# Patient Record
Sex: MALE | Race: WHITE | NOT HISPANIC OR LATINO | URBAN - METROPOLITAN AREA
[De-identification: names, ages, dates, MRNs, and addresses within clinical notes are randomized per-mention and may not be internally consistent; named-entity substitution may affect disease eponyms.]

---

## 2021-04-08 DIAGNOSIS — Z23 ENCOUNTER FOR IMMUNIZATION: ICD-10-CM

## 2025-04-23 ENCOUNTER — ANESTHESIA EVENT (OUTPATIENT)
Dept: PERIOP | Facility: HOSPITAL | Age: 70
DRG: 698 | End: 2025-04-23
Payer: MEDICARE

## 2025-04-23 RX ORDER — PRAVASTATIN SODIUM 40 MG
40 TABLET ORAL DAILY
COMMUNITY

## 2025-04-23 RX ORDER — PIOGLITAZONE 45 MG/1
45 TABLET ORAL DAILY
COMMUNITY

## 2025-04-23 RX ORDER — QUETIAPINE FUMARATE 100 MG/1
100 TABLET, FILM COATED ORAL
COMMUNITY

## 2025-04-23 RX ORDER — ALFUZOSIN HYDROCHLORIDE 10 MG/1
10 TABLET, EXTENDED RELEASE ORAL DAILY
COMMUNITY

## 2025-04-23 RX ORDER — METOPROLOL SUCCINATE 50 MG/1
50 TABLET, EXTENDED RELEASE ORAL DAILY
COMMUNITY

## 2025-04-23 RX ORDER — QUETIAPINE FUMARATE 400 MG/1
400 TABLET, FILM COATED ORAL
COMMUNITY

## 2025-04-23 RX ORDER — VITAMIN B COMPLEX
1 CAPSULE ORAL DAILY
COMMUNITY

## 2025-04-23 RX ORDER — ZOLPIDEM TARTRATE 5 MG/1
5 TABLET ORAL
COMMUNITY

## 2025-04-23 RX ORDER — ASPIRIN 81 MG/1
81 TABLET, CHEWABLE ORAL DAILY
COMMUNITY

## 2025-04-23 RX ORDER — GLIMEPIRIDE 4 MG/1
4 TABLET ORAL
COMMUNITY

## 2025-04-23 NOTE — PRE-PROCEDURE INSTRUCTIONS
Pre-Surgery Instructions:   Medication Instructions    alfuzosin (UROXATRAL) 10 mg 24 hr tablet Hold day of surgery.    aspirin 81 mg chewable tablet Instructions provided by MD    b complex vitamins capsule Stop taking 7 days prior to surgery.    Cholecalciferol (VITAMIN D3) 1,000 units tablet Stop taking 7 days prior to surgery.    glimepiride (AMARYL) 4 mg tablet Hold day of surgery.    metFORMIN (GLUCOPHAGE) 1000 MG tablet Hold day of surgery.    metoprolol succinate (TOPROL-XL) 50 mg 24 hr tablet Take day of surgery.    pioglitazone (ACTOS) 45 mg tablet Hold day of surgery.    pravastatin (PRAVACHOL) 40 mg tablet Take night before surgery    QUEtiapine (SEROquel) 100 mg tablet Take night before surgery    QUEtiapine (SEROquel) 400 MG tablet Take night before surgery    zolpidem (Ambien) 5 mg tablet Hold day of surgery.   Instructed to hold asa @ CC appt. Medication instructions for day of surgery reviewed. Please take all instructed medications with only a sip of water.       You will receive a call one business day prior to surgery with an arrival time and hospital directions. If your surgery is scheduled on a Monday, the hospital will be calling you on the Friday prior to your surgery. If you have not heard from anyone by 8pm, please call the hospital supervisor through the hospital  at [phone]. ([place] [phone] or [place] [phone]).    Do not eat or drink anything after midnight the night before your surgery, including candy, mints, lifesavers, or chewing gum. Do not drink alcohol 24hrs before your surgery. Try not to smoke at least 24hrs before your surgery.       Follow the pre surgery showering instructions as listed in the “My Surgical Experience Booklet” or otherwise provided by your surgeon's office. Do not use a blade to shave the surgical area 1 week before surgery. It is okay to use a clean electric clippers up to 24 hours before surgery. Do not apply any lotions, creams,  including makeup, cologne, deodorant, or perfumes after showering on the day of your surgery. Do not use dry shampoo, hair spray, hair gel, or any type of hair products.     No contact lenses, eye make-up, or artificial eyelashes. Remove nail polish, including gel polish, and any artificial, gel, or acrylic nails if possible. Remove all jewelry including rings and body piercing jewelry.     Wear causal clothing that is easy to take on and off. Consider your type of surgery.    Keep any valuables, jewelry, piercings at home. Please bring any specially ordered equipment (sling, braces) if indicated.    Arrange for a responsible person to drive you to and from the hospital on the day of your surgery. Please confirm the visitor policy for the day of your procedure when you receive your phone call with an arrival time.     Call the surgeon's office with any new illnesses, exposures, or additional questions prior to surgery.    Please reference your “My Surgical Experience Booklet” for additional information to prepare for your upcoming surgery.
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
## 2025-05-01 ENCOUNTER — ANESTHESIA (OUTPATIENT)
Dept: PERIOP | Facility: HOSPITAL | Age: 70
DRG: 698 | End: 2025-05-01
Payer: MEDICARE

## 2025-05-01 ENCOUNTER — HOSPITAL ENCOUNTER (OUTPATIENT)
Facility: HOSPITAL | Age: 70
Setting detail: OUTPATIENT SURGERY
Discharge: HOME | DRG: 698 | End: 2025-05-01
Attending: SPECIALIST | Admitting: SPECIALIST
Payer: MEDICARE

## 2025-05-01 VITALS
TEMPERATURE: 97.1 F | WEIGHT: 254.41 LBS | DIASTOLIC BLOOD PRESSURE: 84 MMHG | HEART RATE: 60 BPM | SYSTOLIC BLOOD PRESSURE: 155 MMHG | OXYGEN SATURATION: 100 % | BODY MASS INDEX: 34.46 KG/M2 | HEIGHT: 72 IN | RESPIRATION RATE: 18 BRPM

## 2025-05-01 DIAGNOSIS — N40.1 BENIGN PROSTATIC HYPERPLASIA WITH LOWER URINARY TRACT SYMPTOMS: ICD-10-CM

## 2025-05-01 LAB — GLUCOSE SERPL-MCNC: 162 MG/DL (ref 65–140)

## 2025-05-01 PROCEDURE — 88305 TISSUE EXAM BY PATHOLOGIST: CPT | Performed by: PATHOLOGY

## 2025-05-01 PROCEDURE — 82948 REAGENT STRIP/BLOOD GLUCOSE: CPT

## 2025-05-01 RX ORDER — FENTANYL CITRATE 50 UG/ML
INJECTION, SOLUTION INTRAMUSCULAR; INTRAVENOUS AS NEEDED
Status: DISCONTINUED | OUTPATIENT
Start: 2025-05-01 | End: 2025-05-01

## 2025-05-01 RX ORDER — GLYCINE 1.5 G/100ML
SOLUTION IRRIGATION AS NEEDED
Status: DISCONTINUED | OUTPATIENT
Start: 2025-05-01 | End: 2025-05-01 | Stop reason: HOSPADM

## 2025-05-01 RX ORDER — MIDAZOLAM HYDROCHLORIDE 2 MG/2ML
INJECTION, SOLUTION INTRAMUSCULAR; INTRAVENOUS AS NEEDED
Status: DISCONTINUED | OUTPATIENT
Start: 2025-05-01 | End: 2025-05-01

## 2025-05-01 RX ORDER — OXYCODONE AND ACETAMINOPHEN 5; 325 MG/1; MG/1
1 TABLET ORAL ONCE AS NEEDED
Status: DISCONTINUED | OUTPATIENT
Start: 2025-05-01 | End: 2025-05-01 | Stop reason: HOSPADM

## 2025-05-01 RX ORDER — FENTANYL CITRATE/PF 50 MCG/ML
25 SYRINGE (ML) INJECTION
Status: DISCONTINUED | OUTPATIENT
Start: 2025-05-01 | End: 2025-05-01 | Stop reason: HOSPADM

## 2025-05-01 RX ORDER — ONDANSETRON 2 MG/ML
4 INJECTION INTRAMUSCULAR; INTRAVENOUS ONCE AS NEEDED
Status: DISCONTINUED | OUTPATIENT
Start: 2025-05-01 | End: 2025-05-01 | Stop reason: HOSPADM

## 2025-05-01 RX ORDER — PROPOFOL 10 MG/ML
INJECTION, EMULSION INTRAVENOUS AS NEEDED
Status: DISCONTINUED | OUTPATIENT
Start: 2025-05-01 | End: 2025-05-01

## 2025-05-01 RX ORDER — CEFAZOLIN SODIUM 2 G/50ML
2000 SOLUTION INTRAVENOUS ONCE
Status: COMPLETED | OUTPATIENT
Start: 2025-05-01 | End: 2025-05-01

## 2025-05-01 RX ORDER — DEXAMETHASONE SODIUM PHOSPHATE 10 MG/ML
INJECTION, SOLUTION INTRAMUSCULAR; INTRAVENOUS AS NEEDED
Status: DISCONTINUED | OUTPATIENT
Start: 2025-05-01 | End: 2025-05-01

## 2025-05-01 RX ORDER — HYDROMORPHONE HCL/PF 1 MG/ML
0.5 SYRINGE (ML) INJECTION
Status: DISCONTINUED | OUTPATIENT
Start: 2025-05-01 | End: 2025-05-01 | Stop reason: HOSPADM

## 2025-05-01 RX ORDER — LIDOCAINE HCL/PF 100 MG/5ML
SYRINGE (ML) INJECTION AS NEEDED
Status: DISCONTINUED | OUTPATIENT
Start: 2025-05-01 | End: 2025-05-01

## 2025-05-01 RX ORDER — ONDANSETRON 2 MG/ML
INJECTION INTRAMUSCULAR; INTRAVENOUS AS NEEDED
Status: DISCONTINUED | OUTPATIENT
Start: 2025-05-01 | End: 2025-05-01

## 2025-05-01 RX ADMIN — FENTANYL CITRATE 50 MCG: 50 INJECTION, SOLUTION INTRAMUSCULAR; INTRAVENOUS at 09:59

## 2025-05-01 RX ADMIN — MIDAZOLAM 2 MG: 1 INJECTION INTRAMUSCULAR; INTRAVENOUS at 09:32

## 2025-05-01 RX ADMIN — PROPOFOL 200 MG: 10 INJECTION, EMULSION INTRAVENOUS at 09:35

## 2025-05-01 RX ADMIN — CEFAZOLIN SODIUM 2000 MG: 2 SOLUTION INTRAVENOUS at 09:32

## 2025-05-01 RX ADMIN — FENTANYL CITRATE 50 MCG: 50 INJECTION, SOLUTION INTRAMUSCULAR; INTRAVENOUS at 09:35

## 2025-05-01 RX ADMIN — LIDOCAINE HYDROCHLORIDE 100 MG: 20 INJECTION, SOLUTION INTRAVENOUS at 09:35

## 2025-05-01 RX ADMIN — DEXAMETHASONE SODIUM PHOSPHATE 10 MG: 10 INJECTION, SOLUTION INTRAMUSCULAR; INTRAVENOUS at 09:41

## 2025-05-01 RX ADMIN — FENTANYL CITRATE 50 MCG: 50 INJECTION, SOLUTION INTRAMUSCULAR; INTRAVENOUS at 09:44

## 2025-05-01 RX ADMIN — ONDANSETRON 4 MG: 2 INJECTION INTRAMUSCULAR; INTRAVENOUS at 09:41

## 2025-05-01 NOTE — PERIOPERATIVE NURSING NOTE
Received from [facility] . Awake,alert. Call bell in reach, rails up. #22 bullock patent for pink tinged urine.

## 2025-05-01 NOTE — DISCHARGE INSTR - AVS FIRST PAGE
#1 no heavy straining or lifting above 10 pounds for 2 weeks    #2 call office fevers, chills, or worsening blood in the urine.    #3  Do not remove tape from leg no aspirin Motrin or nonsteroidals no straining to present to office tomorrow 945 to have catheter out drink lots of water follow-up visit Dr. Ramirez me 30th 10:45 AM      Ike Ramirez M.D. [facility] office  [address] [place] [address].  [place] [address]  [phone]  8:30 AM to 4:30 PM  Monday through Friday    [place] office  [address]  [address]  [place] [address] [phone]  1:00 to 5:00 PM  Wednesday

## 2025-05-01 NOTE — ANESTHESIA POSTPROCEDURE EVALUATION
Post-Op Assessment Note    CV Status:  Stable    Pain management: adequate       Mental Status:  Alert and awake   Hydration Status:  Euvolemic   PONV Controlled:  Controlled   Airway Patency:  Patent     Post Op Vitals Reviewed: Yes    No anethesia notable event occurred.    Staff: Anesthesiologist           Last Filed PACU Vitals:  Vitals Value Taken Time   Temp 97    Pulse 68    /76    Resp 14    SpO2 99

## 2025-05-01 NOTE — PROGRESS NOTES
"Progress Note - Urology      Patient: Chase Man   : 1955 Sex: male   MRN: [id number]     CSN: [id number]  Unit/Bed#: OR POOL     SUBJECTIVE:   Patient surgical preop unit  No change history physical aware of postop ED retrograde ejaculation stress incontinence urinary retention additional urologic procedures      Objective   Vitals: Ht 6' (1.829 m)   Wt 119 kg (263 lb)   BMI 35.67 kg/m²     No intake/output data recorded.      Physical Exam:   General Alert awake   Normocephalic atraumatic PERRLA  Lungs clear bilaterally  Cardiac normal S1 normal S2  Abdomen soft, flank pain  Extremities no edema      Lab Results: CBC: No results found for: \"WBC\", \"HGB\", \"HCT\", \"MCV\", \"PLT\", \"ADJUSTEDWBC\", \"RBC\", \"MCH\", \"MCHC\", \"RDW\", \"MPV\", \"NRBC\"  CMP:   Lab Results   Component Value Date     2024    CO2 30 2024    BUN 15 2024    CREATININE 0.94 2024    CALCIUM 9.4 2024    AST 17 2019    ALT 15 2019    ALKPHOS 47 2019    EGFR 89 2024     Urinalysis: No results found for: \"COLORU\", \"CLARITYU\", \"SPECGRAV\", \"PHUR\", \"LEUKOCYTESUR\", \"NITRITE\", \"PROTEINUA\", \"GLUCOSEU\", \"KETONESU\", \"BILIRUBINUR\", \"BLOODU\"  Urine Culture: No results found for: \"URINECX\"  PSA: No results found for: \"PSA\"      Assessment/ Plan:  Cystoscopy TURP          Ike Ramirez MD  "

## 2025-05-01 NOTE — OP NOTE
OPERATIVE REPORT  PATIENT NAME: Chase Man    :  1955  MRN: [id number]  Pt Location: [facility] OR ROOM 04    SURGERY DATE: 2025    Surgeons and Role:     * Ike Ramirez MD - Primary    Preop Diagnosis:  Benign prostatic hyperplasia with lower urinary tract symptoms [N40.1]    Post-Op Diagnosis Codes:     * Benign prostatic hyperplasia with lower urinary tract symptoms [N40.1]    Procedure(s):  CYSTOSCOPY. TRANSURETHRAL RESECTION OF PROSTATE (TURP)    Specimen(s):  ID Type Source Tests Collected by Time Destination   1 : prostate chips Tissue Prostate TISSUE EXAM Ike Ramirez MD 2025 0935        Estimated Blood Loss:   Minimal    Drains:  Urethral Catheter Three way;Latex 22 Fr. (Active)   Site Assessment Clean 25 1046   Collection Container Standard drainage bag 25 1046   Output (mL) 1000 mL 25 1046   Number of days: 0       Anesthesia Type:   General    Operative Indications:  Benign prostatic hyperplasia with lower urinary tract symptoms [N40.1]  70-year-old male seen in the office status post microwave therapy with worsening urinary issues found on flex cystoscopy to have bilobar a minimally obstructing 2.5 cm prostatic urethra patient wished to undergo cystoscopy TURP aware risk of anesthesia infection bleeding postop ED retrograde ejaculation stress incontinence    Operative Findings:  2.5 cm by lobar obstructing prostate Path pending      Complications:   None    Procedure and Technique:  Patient identified in the holding area consent reviewed wished to proceed with procedure placed in the OR suite after anesthesia induced placed in lithotomy position draped and prepped in standard fashion timeout performed 22 Iranian cystoscope passed through the bladder anterior to showed maladies posterior to confirmed a 2.5 cm by lobar obstructing prostatic urethra with elevated bladder neck scope inserted the bladder lumen trigone away from the bladder neck scope removed 27 Iranian  resectoscope sheath and obturator placed through the urethra into the bladder 30 degree lens resection of the prostate started with the left lobe between 1 and 5:00 followed by the right lobe between 11 and 7:00 followed by the roof between 11 and 1:00 and finally the floor of a 5 and 7:00 for bladder neck to verumontanum hemostasis by cautery all prostatic chips were evacuated from the bladder with the help of an Ellik and sent to pathology on view for the verumontanum prostatic urethra open on view from bladder neck trigone unharmed scope removed 22 Greek three-way Valentine cath inserted with continuous bladder irrigation Valentine's balloon to mild traction at time of dictation CBI clear patient epidural awakened in recovery room stable addition   I was present for the entire procedure.    Patient Disposition:  PACU              SIGNATURE: Ike Ramirez MD  DATE: May 1, 2025  TIME: 10:56 AM

## 2025-05-01 NOTE — ANESTHESIA PREPROCEDURE EVALUATION
Procedure:  CYSTOSCOPY, TRANSURETHRAL RESECTION OF PROSTATE (TURP) (Abdomen)    Relevant Problems   No relevant active problems   Defib in place. Seen cardiologist in last month.     Physical Exam    Airway    Mallampati score: II  TM Distance: >3 FB  Neck ROM: full     Dental   No notable dental hx upper dentures,     Cardiovascular  Cardiovascular exam normal    Pulmonary  Pulmonary exam normal     Other Findings        Anesthesia Plan  ASA Score- 3     Anesthesia Type- general with ASA Monitors.         Additional Monitors:     Airway Plan: LMA.           Plan Factors-Exercise tolerance (METS): >4 METS.    Chart reviewed.   Existing labs reviewed. Patient summary reviewed.    Patient is not a current smoker.      Obstructive sleep apnea risk education given perioperatively.        Induction- intravenous.    Postoperative Plan- Plan for postoperative opioid use.     Perioperative Resuscitation Plan - Level 1 - Full Code.       Informed Consent- Anesthetic plan and risks discussed with patient.  I personally reviewed this patient with the CRNA. Discussed and agreed on the Anesthesia Plan with the CRNA..      NPO Status:  No vitals data found for the desired time range.

## 2025-05-01 NOTE — PERIOPERATIVE NURSING NOTE
Received patient from PACU,accompanied by Adrienne HUA and Samy RN,awake and alert.Bed in lowest position,call bell in reach,side rails are up and bed is locked.

## 2025-05-01 NOTE — PERIOPERATIVE NURSING NOTE
Discharge instructions given to patient and spouse and received well.IV HL removed.Patient dressed and transferred via wheelchair to lobby and to car for discharge home.

## 2025-05-02 ENCOUNTER — HOSPITAL ENCOUNTER (INPATIENT)
Facility: HOSPITAL | Age: 70
LOS: 2 days | Discharge: HOME | DRG: 698 | End: 2025-05-04
Admitting: INTERNAL MEDICINE
Payer: MEDICARE

## 2025-05-02 ENCOUNTER — NURSE TRIAGE (OUTPATIENT)
Dept: OTHER | Facility: OTHER | Age: 70
End: 2025-05-02

## 2025-05-02 ENCOUNTER — APPOINTMENT (EMERGENCY)
Dept: RADIOLOGY | Facility: HOSPITAL | Age: 70
DRG: 698 | End: 2025-05-02
Payer: MEDICARE

## 2025-05-02 DIAGNOSIS — N39.0 UTI (URINARY TRACT INFECTION): ICD-10-CM

## 2025-05-02 DIAGNOSIS — A41.9 SEPSIS (HCC): ICD-10-CM

## 2025-05-02 DIAGNOSIS — Z90.79 S/P TURP: ICD-10-CM

## 2025-05-02 DIAGNOSIS — F17.200 NICOTINE DEPENDENCE: ICD-10-CM

## 2025-05-02 DIAGNOSIS — R33.9 URINARY RETENTION: Primary | ICD-10-CM

## 2025-05-02 LAB
ALBUMIN SERPL BCG-MCNC: 4.1 G/DL (ref 3.5–5)
ALP SERPL-CCNC: 41 U/L (ref 34–104)
ALT SERPL W P-5'-P-CCNC: 27 U/L (ref 7–52)
ANION GAP SERPL CALCULATED.3IONS-SCNC: 16 MMOL/L (ref 4–13)
APTT PPP: 25 SECONDS (ref 23–34)
AST SERPL W P-5'-P-CCNC: 38 U/L (ref 13–39)
BACTERIA UR QL AUTO: ABNORMAL /HPF
BASOPHILS # BLD AUTO: 0.02 THOUSANDS/ÂΜL (ref 0–0.1)
BASOPHILS NFR BLD AUTO: 0 % (ref 0–1)
BILIRUB SERPL-MCNC: 1.38 MG/DL (ref 0.2–1)
BILIRUB UR QL STRIP: NEGATIVE
BUN SERPL-MCNC: 25 MG/DL (ref 5–25)
CALCIUM SERPL-MCNC: 8.9 MG/DL (ref 8.4–10.2)
CHLORIDE SERPL-SCNC: 97 MMOL/L (ref 96–108)
CLARITY UR: CLEAR
CO2 SERPL-SCNC: 19 MMOL/L (ref 21–32)
COLOR UR: YELLOW
CREAT SERPL-MCNC: 2.09 MG/DL (ref 0.6–1.3)
EOSINOPHIL # BLD AUTO: 0.01 THOUSAND/ÂΜL (ref 0–0.61)
EOSINOPHIL NFR BLD AUTO: 0 % (ref 0–6)
ERYTHROCYTE [DISTWIDTH] IN BLOOD BY AUTOMATED COUNT: 14.1 % (ref 11.6–15.1)
GFR SERPL CREATININE-BSD FRML MDRD: 31 ML/MIN/1.73SQ M
GLUCOSE SERPL-MCNC: 155 MG/DL (ref 65–140)
GLUCOSE UR STRIP-MCNC: NEGATIVE MG/DL
HCT VFR BLD AUTO: 39.1 % (ref 36.5–49.3)
HGB BLD-MCNC: 13.3 G/DL (ref 12–17)
HGB UR QL STRIP.AUTO: ABNORMAL
IMM GRANULOCYTES # BLD AUTO: 0.05 THOUSAND/UL (ref 0–0.2)
IMM GRANULOCYTES NFR BLD AUTO: 0 % (ref 0–2)
INR PPP: 0.96 (ref 0.85–1.19)
KETONES UR STRIP-MCNC: NEGATIVE MG/DL
LACTATE SERPL-SCNC: 1.7 MMOL/L (ref 0.5–2)
LEUKOCYTE ESTERASE UR QL STRIP: ABNORMAL
LYMPHOCYTES # BLD AUTO: 1.91 THOUSANDS/ÂΜL (ref 0.6–4.47)
LYMPHOCYTES NFR BLD AUTO: 15 % (ref 14–44)
MCH RBC QN AUTO: 31 PG (ref 26.8–34.3)
MCHC RBC AUTO-ENTMCNC: 34 G/DL (ref 31.4–37.4)
MCV RBC AUTO: 91 FL (ref 82–98)
MONOCYTES # BLD AUTO: 0.95 THOUSAND/ÂΜL (ref 0.17–1.22)
MONOCYTES NFR BLD AUTO: 8 % (ref 4–12)
NEUTROPHILS # BLD AUTO: 9.74 THOUSANDS/ÂΜL (ref 1.85–7.62)
NEUTS SEG NFR BLD AUTO: 77 % (ref 43–75)
NITRITE UR QL STRIP: NEGATIVE
NON-SQ EPI CELLS URNS QL MICRO: ABNORMAL /HPF
NRBC BLD AUTO-RTO: 0 /100 WBCS
PH UR STRIP.AUTO: 5.5 [PH]
PLATELET # BLD AUTO: 191 THOUSANDS/UL (ref 149–390)
PMV BLD AUTO: 10.9 FL (ref 8.9–12.7)
POTASSIUM SERPL-SCNC: 3.9 MMOL/L (ref 3.5–5.3)
PROCALCITONIN SERPL-MCNC: 0.08 NG/ML
PROT SERPL-MCNC: 6.9 G/DL (ref 6.4–8.4)
PROT UR STRIP-MCNC: ABNORMAL MG/DL
PROTHROMBIN TIME: 13.3 SECONDS (ref 12.3–15)
RBC # BLD AUTO: 4.29 MILLION/UL (ref 3.88–5.62)
RBC #/AREA URNS AUTO: ABNORMAL /HPF
SODIUM SERPL-SCNC: 132 MMOL/L (ref 135–147)
SP GR UR STRIP.AUTO: 1.01 (ref 1–1.03)
UROBILINOGEN UR STRIP-ACNC: <2 MG/DL
WBC # BLD AUTO: 12.68 THOUSAND/UL (ref 4.31–10.16)
WBC #/AREA URNS AUTO: ABNORMAL /HPF

## 2025-05-02 PROCEDURE — 87040 BLOOD CULTURE FOR BACTERIA: CPT

## 2025-05-02 PROCEDURE — 80053 COMPREHEN METABOLIC PANEL: CPT

## 2025-05-02 PROCEDURE — 81001 URINALYSIS AUTO W/SCOPE: CPT

## 2025-05-02 PROCEDURE — 99285 EMERGENCY DEPT VISIT HI MDM: CPT

## 2025-05-02 PROCEDURE — 74176 CT ABD & PELVIS W/O CONTRAST: CPT

## 2025-05-02 PROCEDURE — 0T9B70Z DRAINAGE OF BLADDER WITH DRAINAGE DEVICE, VIA NATURAL OR ARTIFICIAL OPENING: ICD-10-PCS

## 2025-05-02 PROCEDURE — 84145 PROCALCITONIN (PCT): CPT

## 2025-05-02 PROCEDURE — 96365 THER/PROPH/DIAG IV INF INIT: CPT

## 2025-05-02 PROCEDURE — 85730 THROMBOPLASTIN TIME PARTIAL: CPT

## 2025-05-02 PROCEDURE — 87086 URINE CULTURE/COLONY COUNT: CPT

## 2025-05-02 PROCEDURE — 83605 ASSAY OF LACTIC ACID: CPT

## 2025-05-02 PROCEDURE — 36415 COLL VENOUS BLD VENIPUNCTURE: CPT

## 2025-05-02 PROCEDURE — 85025 COMPLETE CBC W/AUTO DIFF WBC: CPT

## 2025-05-02 PROCEDURE — 85610 PROTHROMBIN TIME: CPT

## 2025-05-02 PROCEDURE — 99223 1ST HOSP IP/OBS HIGH 75: CPT | Performed by: INTERNAL MEDICINE

## 2025-05-02 RX ORDER — ACETAMINOPHEN 325 MG/1
650 TABLET ORAL EVERY 6 HOURS PRN
Status: DISCONTINUED | OUTPATIENT
Start: 2025-05-02 | End: 2025-05-04 | Stop reason: HOSPADM

## 2025-05-02 RX ORDER — ASPIRIN 81 MG/1
81 TABLET, CHEWABLE ORAL DAILY
Status: DISCONTINUED | OUTPATIENT
Start: 2025-05-03 | End: 2025-05-04 | Stop reason: HOSPADM

## 2025-05-02 RX ORDER — QUETIAPINE FUMARATE 100 MG/1
100 TABLET, FILM COATED ORAL
Status: DISCONTINUED | OUTPATIENT
Start: 2025-05-03 | End: 2025-05-04 | Stop reason: HOSPADM

## 2025-05-02 RX ORDER — CEFTRIAXONE 2 G/50ML
2000 INJECTION, SOLUTION INTRAVENOUS EVERY 24 HOURS
Status: DISCONTINUED | OUTPATIENT
Start: 2025-05-03 | End: 2025-05-04 | Stop reason: HOSPADM

## 2025-05-02 RX ORDER — CEFTRIAXONE 2 G/50ML
2000 INJECTION, SOLUTION INTRAVENOUS ONCE
Status: COMPLETED | OUTPATIENT
Start: 2025-05-02 | End: 2025-05-02

## 2025-05-02 RX ORDER — HYDROMORPHONE HCL IN WATER/PF 6 MG/30 ML
0.2 PATIENT CONTROLLED ANALGESIA SYRINGE INTRAVENOUS
Status: DISCONTINUED | OUTPATIENT
Start: 2025-05-02 | End: 2025-05-04

## 2025-05-02 RX ORDER — INSULIN LISPRO 100 [IU]/ML
2-12 INJECTION, SOLUTION INTRAVENOUS; SUBCUTANEOUS
Status: DISCONTINUED | OUTPATIENT
Start: 2025-05-03 | End: 2025-05-04 | Stop reason: HOSPADM

## 2025-05-02 RX ORDER — SODIUM CHLORIDE 9 MG/ML
100 INJECTION, SOLUTION INTRAVENOUS CONTINUOUS
Status: DISCONTINUED | OUTPATIENT
Start: 2025-05-03 | End: 2025-05-04

## 2025-05-02 RX ORDER — PRAVASTATIN SODIUM 40 MG
40 TABLET ORAL
Status: DISCONTINUED | OUTPATIENT
Start: 2025-05-03 | End: 2025-05-04 | Stop reason: HOSPADM

## 2025-05-02 RX ORDER — TAMSULOSIN HYDROCHLORIDE 0.4 MG/1
0.4 CAPSULE ORAL
Status: DISCONTINUED | OUTPATIENT
Start: 2025-05-03 | End: 2025-05-04 | Stop reason: HOSPADM

## 2025-05-02 RX ORDER — ACETAMINOPHEN 325 MG/1
975 TABLET ORAL ONCE
Status: COMPLETED | OUTPATIENT
Start: 2025-05-02 | End: 2025-05-02

## 2025-05-02 RX ORDER — QUETIAPINE FUMARATE 100 MG/1
400 TABLET, FILM COATED ORAL
Status: DISCONTINUED | OUTPATIENT
Start: 2025-05-03 | End: 2025-05-04 | Stop reason: HOSPADM

## 2025-05-02 RX ORDER — HEPARIN SODIUM 5000 [USP'U]/ML
5000 INJECTION, SOLUTION INTRAVENOUS; SUBCUTANEOUS EVERY 8 HOURS SCHEDULED
Status: DISCONTINUED | OUTPATIENT
Start: 2025-05-03 | End: 2025-05-04 | Stop reason: HOSPADM

## 2025-05-02 RX ORDER — METOPROLOL SUCCINATE 50 MG/1
50 TABLET, EXTENDED RELEASE ORAL DAILY
Status: DISCONTINUED | OUTPATIENT
Start: 2025-05-03 | End: 2025-05-04 | Stop reason: HOSPADM

## 2025-05-02 RX ORDER — HEPARIN SODIUM 5000 [USP'U]/ML
5000 INJECTION, SOLUTION INTRAVENOUS; SUBCUTANEOUS EVERY 8 HOURS SCHEDULED
Status: DISCONTINUED | OUTPATIENT
Start: 2025-05-03 | End: 2025-05-02

## 2025-05-02 RX ORDER — OXYCODONE HYDROCHLORIDE 5 MG/1
5 TABLET ORAL EVERY 6 HOURS PRN
Refills: 0 | Status: DISCONTINUED | OUTPATIENT
Start: 2025-05-02 | End: 2025-05-04

## 2025-05-02 RX ORDER — ONDANSETRON 2 MG/ML
4 INJECTION INTRAMUSCULAR; INTRAVENOUS EVERY 4 HOURS PRN
Status: DISCONTINUED | OUTPATIENT
Start: 2025-05-02 | End: 2025-05-04 | Stop reason: HOSPADM

## 2025-05-02 RX ADMIN — SODIUM CHLORIDE 1000 ML: 0.9 INJECTION, SOLUTION INTRAVENOUS at 20:33

## 2025-05-02 RX ADMIN — CEFTRIAXONE 2000 MG: 2 INJECTION, SOLUTION INTRAVENOUS at 20:33

## 2025-05-02 RX ADMIN — ACETAMINOPHEN 975 MG: 325 TABLET, FILM COATED ORAL at 20:08

## 2025-05-02 NOTE — TELEPHONE ENCOUNTER
"Regarding: Post procedure issues unable to void  ----- Message from Brook RECINOS sent at 5/2/2025  6:21 PM EDT -----  \"My  had a procedure yesterday with urology and he is unable to void and it's been over 8 hours now. I am not sure what to do \"    "

## 2025-05-02 NOTE — TELEPHONE ENCOUNTER
"FOLLOW UP: Yes, patient may need follow up visit from the Emergency Department    REASON FOR CONVERSATION: Post-op Problem    SYMPTOMS: has not voided since this morning    OTHER: N/A    DISPOSITION: Go to ED Now (or PCP Triage)  Emergency Contact agreeable to take patient to [facility] Emergency Department.     Reason for Disposition   Sounds like a serious complication to the triager    Answer Assessment - Initial Assessment Questions  1. SYMPTOM: \"What's the main symptom you're concerned about?\" (e.g., pain, fever, vomiting)      Hasn't voided since this morning    2. ONSET: \"When did S/S  start?\"      This morning    3. SURGERY: \"What surgery did you have?\"   CYSTOSCOPY, TRANSURETHRAL RESECTION OF PROSTATE (TURP) (Abdomen)    4. DATE of SURGERY: \"When was the surgery?\"       Yesterday    5. ANESTHESIA: \"What type of anesthesia did you have?\" (e.g., general, spinal, epidural, local)      General     6. DRAINS: \"Were any drains place in or around the wound?\" (e.g., Hemovac, Chacho-Wilson, Penrose)      Denies    7. PAIN: \"Is there any pain?\" If Yes, ask: \"How bad is it?\"  (Scale 1-10; or mild, moderate, severe)      yes  8. FEVER: \"Do you have a fever?\" If Yes, ask: \"What is your temperature, how was it measured, and when did it start?\"      Denies    9. VOMITING: \"Is there any vomiting?\" If Yes, ask: \"How many times?\"      Denies    10. BLEEDING: \"Is there any bleeding?\" If Yes, ask: \"How much?\" and \"Where?\"        Denies    11. OTHER SYMPTOMS: \"Do you have any other symptoms?\" (e.g., drainage from wound, painful urination, constipation)        Feels constipated, only went a small amount    Protocols used: Post-Op Symptoms and Questions-Adult-AH    "
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
## 2025-05-03 PROBLEM — I42.2 HYPERTROPHIC CARDIOMYOPATHY (HCC): Status: ACTIVE | Noted: 2025-05-03

## 2025-05-03 PROBLEM — E11.9 TYPE 2 DIABETES MELLITUS (HCC): Status: ACTIVE | Noted: 2025-05-03

## 2025-05-03 PROBLEM — E66.9 OBESITY: Status: ACTIVE | Noted: 2025-05-03

## 2025-05-03 PROBLEM — A41.9 SEPSIS (HCC): Status: ACTIVE | Noted: 2025-05-03

## 2025-05-03 PROBLEM — Z90.79 S/P TURP: Status: ACTIVE | Noted: 2025-05-03

## 2025-05-03 PROBLEM — I25.10 CAD (CORONARY ARTERY DISEASE): Status: ACTIVE | Noted: 2025-05-03

## 2025-05-03 PROBLEM — G47.33 OSA (OBSTRUCTIVE SLEEP APNEA): Status: ACTIVE | Noted: 2025-05-03

## 2025-05-03 PROBLEM — N17.9 ACUTE KIDNEY INJURY (HCC): Status: ACTIVE | Noted: 2025-05-03

## 2025-05-03 PROBLEM — E80.6 HYPERBILIRUBINEMIA: Status: ACTIVE | Noted: 2025-05-03

## 2025-05-03 PROBLEM — F99 PSYCHIATRIC DISORDER: Status: ACTIVE | Noted: 2025-05-03

## 2025-05-03 PROBLEM — I10 ESSENTIAL HYPERTENSION: Status: ACTIVE | Noted: 2025-05-03

## 2025-05-03 LAB
ALBUMIN SERPL BCG-MCNC: 3.7 G/DL (ref 3.5–5)
ALP SERPL-CCNC: 35 U/L (ref 34–104)
ALT SERPL W P-5'-P-CCNC: 22 U/L (ref 7–52)
ANION GAP SERPL CALCULATED.3IONS-SCNC: 12 MMOL/L (ref 4–13)
AST SERPL W P-5'-P-CCNC: 28 U/L (ref 13–39)
BASOPHILS # BLD AUTO: 0.01 THOUSANDS/ÂΜL (ref 0–0.1)
BASOPHILS NFR BLD AUTO: 0 % (ref 0–1)
BILIRUB DIRECT SERPL-MCNC: 0.15 MG/DL (ref 0–0.2)
BILIRUB SERPL-MCNC: 0.8 MG/DL (ref 0.2–1)
BUN SERPL-MCNC: 17 MG/DL (ref 5–25)
CALCIUM SERPL-MCNC: 8.5 MG/DL (ref 8.4–10.2)
CHLORIDE SERPL-SCNC: 105 MMOL/L (ref 96–108)
CO2 SERPL-SCNC: 22 MMOL/L (ref 21–32)
CREAT SERPL-MCNC: 1.03 MG/DL (ref 0.6–1.3)
EOSINOPHIL # BLD AUTO: 0.02 THOUSAND/ÂΜL (ref 0–0.61)
EOSINOPHIL NFR BLD AUTO: 0 % (ref 0–6)
ERYTHROCYTE [DISTWIDTH] IN BLOOD BY AUTOMATED COUNT: 14.1 % (ref 11.6–15.1)
EST. AVERAGE GLUCOSE BLD GHB EST-MCNC: 146 MG/DL
GFR SERPL CREATININE-BSD FRML MDRD: 74 ML/MIN/1.73SQ M
GLUCOSE SERPL-MCNC: 126 MG/DL (ref 65–140)
GLUCOSE SERPL-MCNC: 138 MG/DL (ref 65–140)
GLUCOSE SERPL-MCNC: 140 MG/DL (ref 65–140)
GLUCOSE SERPL-MCNC: 144 MG/DL (ref 65–140)
GLUCOSE SERPL-MCNC: 161 MG/DL (ref 65–140)
GLUCOSE SERPL-MCNC: 165 MG/DL (ref 65–140)
GLUCOSE SERPL-MCNC: 169 MG/DL (ref 65–140)
HBA1C MFR BLD: 6.7 %
HCT VFR BLD AUTO: 36 % (ref 36.5–49.3)
HGB BLD-MCNC: 12 G/DL (ref 12–17)
IMM GRANULOCYTES # BLD AUTO: 0.05 THOUSAND/UL (ref 0–0.2)
IMM GRANULOCYTES NFR BLD AUTO: 1 % (ref 0–2)
LYMPHOCYTES # BLD AUTO: 2.19 THOUSANDS/ÂΜL (ref 0.6–4.47)
LYMPHOCYTES NFR BLD AUTO: 25 % (ref 14–44)
MCH RBC QN AUTO: 30.5 PG (ref 26.8–34.3)
MCHC RBC AUTO-ENTMCNC: 33.3 G/DL (ref 31.4–37.4)
MCV RBC AUTO: 92 FL (ref 82–98)
MONOCYTES # BLD AUTO: 0.73 THOUSAND/ÂΜL (ref 0.17–1.22)
MONOCYTES NFR BLD AUTO: 8 % (ref 4–12)
NEUTROPHILS # BLD AUTO: 5.8 THOUSANDS/ÂΜL (ref 1.85–7.62)
NEUTS SEG NFR BLD AUTO: 66 % (ref 43–75)
NRBC BLD AUTO-RTO: 0 /100 WBCS
PLATELET # BLD AUTO: 160 THOUSANDS/UL (ref 149–390)
PMV BLD AUTO: 11 FL (ref 8.9–12.7)
POTASSIUM SERPL-SCNC: 3.5 MMOL/L (ref 3.5–5.3)
PROCALCITONIN SERPL-MCNC: 0.14 NG/ML
PROT SERPL-MCNC: 6.2 G/DL (ref 6.4–8.4)
RBC # BLD AUTO: 3.93 MILLION/UL (ref 3.88–5.62)
SODIUM SERPL-SCNC: 139 MMOL/L (ref 135–147)
WBC # BLD AUTO: 8.8 THOUSAND/UL (ref 4.31–10.16)

## 2025-05-03 PROCEDURE — 99232 SBSQ HOSP IP/OBS MODERATE 35: CPT | Performed by: INTERNAL MEDICINE

## 2025-05-03 PROCEDURE — 84145 PROCALCITONIN (PCT): CPT | Performed by: INTERNAL MEDICINE

## 2025-05-03 PROCEDURE — 85025 COMPLETE CBC W/AUTO DIFF WBC: CPT | Performed by: INTERNAL MEDICINE

## 2025-05-03 PROCEDURE — 80053 COMPREHEN METABOLIC PANEL: CPT | Performed by: INTERNAL MEDICINE

## 2025-05-03 PROCEDURE — 83036 HEMOGLOBIN GLYCOSYLATED A1C: CPT | Performed by: INTERNAL MEDICINE

## 2025-05-03 PROCEDURE — 82248 BILIRUBIN DIRECT: CPT | Performed by: INTERNAL MEDICINE

## 2025-05-03 PROCEDURE — 82948 REAGENT STRIP/BLOOD GLUCOSE: CPT

## 2025-05-03 RX ADMIN — QUETIAPINE FUMARATE 100 MG: 100 TABLET ORAL at 00:29

## 2025-05-03 RX ADMIN — CEFTRIAXONE 2000 MG: 2 INJECTION, SOLUTION INTRAVENOUS at 20:00

## 2025-05-03 RX ADMIN — INSULIN LISPRO 2 UNITS: 100 INJECTION, SOLUTION INTRAVENOUS; SUBCUTANEOUS at 22:06

## 2025-05-03 RX ADMIN — QUETIAPINE FUMARATE 400 MG: 100 TABLET ORAL at 22:07

## 2025-05-03 RX ADMIN — ASPIRIN 81 MG: 81 TABLET, CHEWABLE ORAL at 08:54

## 2025-05-03 RX ADMIN — HEPARIN SODIUM 5000 UNITS: 5000 INJECTION INTRAVENOUS; SUBCUTANEOUS at 05:30

## 2025-05-03 RX ADMIN — SODIUM CHLORIDE 100 ML/HR: 0.9 INJECTION, SOLUTION INTRAVENOUS at 00:18

## 2025-05-03 RX ADMIN — QUETIAPINE FUMARATE 400 MG: 100 TABLET ORAL at 00:28

## 2025-05-03 RX ADMIN — HEPARIN SODIUM 5000 UNITS: 5000 INJECTION INTRAVENOUS; SUBCUTANEOUS at 22:09

## 2025-05-03 RX ADMIN — INSULIN LISPRO 2 UNITS: 100 INJECTION, SOLUTION INTRAVENOUS; SUBCUTANEOUS at 11:53

## 2025-05-03 RX ADMIN — HEPARIN SODIUM 5000 UNITS: 5000 INJECTION INTRAVENOUS; SUBCUTANEOUS at 14:20

## 2025-05-03 RX ADMIN — METOPROLOL SUCCINATE 50 MG: 50 TABLET, EXTENDED RELEASE ORAL at 08:54

## 2025-05-03 RX ADMIN — PRAVASTATIN SODIUM 40 MG: 40 TABLET ORAL at 16:15

## 2025-05-03 RX ADMIN — Medication 1 TABLET: at 08:49

## 2025-05-03 RX ADMIN — TAMSULOSIN HYDROCHLORIDE 0.4 MG: 0.4 CAPSULE ORAL at 16:15

## 2025-05-03 RX ADMIN — CHOLECALCIFEROL TAB 25 MCG (1000 UNIT) 1000 UNITS: 25 TAB at 08:49

## 2025-05-03 NOTE — ASSESSMENT & PLAN NOTE
Status post ICD  Continue beta-blockade w/ Toprol-XL (holding parameters in place for hypotension)

## 2025-05-03 NOTE — ASSESSMENT & PLAN NOTE
Underwent TURP on 5/1  PRN pain control  In light of difficulty urinating postoperatively and presenting sepsis,   Urology consulted  Follow-up recommendations

## 2025-05-03 NOTE — ASSESSMENT & PLAN NOTE
Likely sequela of sepsis - transaminases and alkaline phosphatase normal  Check direct bilirubin  No acute hepatobiliary pathology on CT imaging  Continue IV fluids  Limit/avoid hepatotoxins as possible

## 2025-05-03 NOTE — ASSESSMENT & PLAN NOTE
Presented with high-grade fever coupled with tachypnea/leukocytosis, along with acute kidney injury and hyperbilirubinemia  Likely etiology due to a genitourinary source, as patient is status post TURP yesterday  Await blood/urine cultures  Continue IV Rocephin  Baseline procalcitonin/lactic acid normal  Monitor vitals and maintain hemodynamics -> goal MAP > 65 - c/w IV fluids but monitor for iatrogenic fluid overload in the setting of known hypertrophic cardiomyopathy  CT imaging noting postoperative TURP changes

## 2025-05-03 NOTE — ASSESSMENT & PLAN NOTE
Likely sequela of sepsis - transaminases and alkaline phosphatase normal  Resolved, direct bilirubin normal.  No acute hepatobiliary pathology on CT imaging  Continue IV fluids  Limit/avoid hepatotoxins as possible
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
## 2025-05-03 NOTE — ASSESSMENT & PLAN NOTE
Underwent TURP on 5/1  PRN pain control  In light of difficulty urinating postoperatively and presenting sepsis, will appreciate urology input

## 2025-05-03 NOTE — ASSESSMENT & PLAN NOTE
Check updated A1c  Hold oral hypoglycemics while hospitalized - initiated SSI coverage per Accu-Cheks  Carbohydrate restriction  Hypoglycemia protocol

## 2025-05-03 NOTE — ASSESSMENT & PLAN NOTE
Follow-up updated A1c  Hold oral hypoglycemics while hospitalized - initiated SSI coverage per Accu-Cheks  Carbohydrate restriction  Hypoglycemia protocol
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
## 2025-05-03 NOTE — PLAN OF CARE
Problem: PAIN - ADULT  Goal: Verbalizes/displays adequate comfort level or baseline comfort level  Description: Interventions:- Encourage patient to monitor pain and request assistance- Assess pain using appropriate pain scale- Administer analgesics based on type and severity of pain and evaluate response- Implement non-pharmacological measures as appropriate and evaluate response- Consider cultural and social influences on pain and pain management- Notify physician/advanced practitioner if interventions unsuccessful or patient reports new pain  Outcome: Progressing     Problem: INFECTION - ADULT  Goal: Absence or prevention of progression during hospitalization  Description: INTERVENTIONS:- Assess and monitor for signs and symptoms of infection- Monitor lab/diagnostic results- Monitor all insertion sites, i.e. indwelling lines, tubes, and drains- Monitor endotracheal if appropriate and nasal secretions for changes in amount and color- [place] appropriate cooling/warming therapies per order- Administer medications as ordered- Instruct and encourage patient and family to use good hand hygiene technique- Identify and instruct in appropriate isolation precautions for identified infection/condition  Outcome: Progressing  Goal: Absence of fever/infection during neutropenic period  Description: INTERVENTIONS:- Monitor WBC  Outcome: Progressing     Problem: GENITOURINARY - ADULT  Goal: Maintains or returns to baseline urinary function  Description: INTERVENTIONS:- Assess urinary function- Encourage oral fluids to ensure adequate hydration if ordered- Administer IV fluids as ordered to ensure adequate hydration- Administer ordered medications as needed- Offer frequent toileting- Follow urinary retention protocol if ordered  Outcome: Progressing  Goal: Absence of urinary retention  Description: INTERVENTIONS:- Assess patient’s ability to void and empty bladder- Monitor I/O- Bladder scan as needed- Discuss with  physician/AP medications to alleviate retention as needed- Discuss catheterization for long term situations as appropriate  Outcome: Progressing  Goal: Urinary catheter remains patent  Description: INTERVENTIONS:- Assess patency of urinary catheter- If patient has a chronic bullock, consider changing catheter if non-functioning- Follow guidelines for intermittent irrigation of non-functioning urinary catheter  Outcome: Progressing     Problem: METABOLIC, FLUID AND ELECTROLYTES - ADULT  Goal: Fluid balance maintained  Description: INTERVENTIONS:- Monitor labs - Monitor I/O and WT- Instruct patient on fluid and nutrition as appropriate- Assess for signs & symptoms of volume excess or deficit  Outcome: Progressing  Goal: Glucose maintained within target range  Description: INTERVENTIONS:- Monitor Blood Glucose as ordered- Assess for signs and symptoms of hyperglycemia and hypoglycemia- Administer ordered medications to maintain glucose within target range- Assess nutritional intake and initiate nutrition service referral as needed  Outcome: Progressing     Problem: HEMATOLOGIC - ADULT  Goal: Maintains hematologic stability  Description: INTERVENTIONS- Assess for signs and symptoms of bleeding or hemorrhage- Monitor labs- Administer supportive blood products/factors as ordered and appropriate  Outcome: Progressing

## 2025-05-03 NOTE — ASSESSMENT & PLAN NOTE
Possible sequela of sepsis and decreased oral fluid intake post-TURP  CT imaging negative for hydronephrosis but did reveal a small subcentimeter nonobstructing right kidney stone, and a left renal cyst (2 cm)  Presenting creatinine elevated at 2.09 (typical baseline of 0.9-1.0)  Valentine catheter was inserted on admission, bladder scan in  cc  Creatinine improved to 1.0.  Urine output 4.9 L   Monitor renal function and urine output   limit/avoid nephrotoxins and hypotension as possible  Continue IV fluid hydration today  Valentine management as per urology  Bowel regimen as patient reported constipation

## 2025-05-03 NOTE — ASSESSMENT & PLAN NOTE
Possible sequela of sepsis and decreased oral fluid intake post-TURP  Continue IV fluid hydration  Presenting creatinine elevated at 2.09 (typical baseline of 0.9-1.0)  Monitor renal function and urine output - limit/avoid nephrotoxins and hypotension as possible  CT imaging negative for hydronephrosis but did reveal a small subcentimeter nonobstructing right kidney stone, and a left renal cyst (2 cm)

## 2025-05-03 NOTE — PROGRESS NOTES
Progress Note - Hospitalist   Name: Chase Man 70 y.o. male I MRN: [id number]  Unit/Bed#: [address] [place] [address] Date of Admission: 5/2/2025   Date of Service: 5/3/2025 I Hospital Day: 1    Assessment & Plan  Sepsis (HCC)  Presented with high-grade fever coupled with tachypnea/leukocytosis, along with acute kidney injury and hyperbilirubinemia.  Normal lactic acid, procalcitonin  UA, large blood, small leukocyte, 1+ protein, 2-4 WBC, occasional bacteria.  Likely etiology due to complicated UTI, suspect associated with TURP, difficulty in voiding   CT abdomen with postoperative TURP changes  Leukocytosis on presentation, now improved  Clinically stable  Continue IV ceftriaxone, appears to have received prior to collection of the urine specimen  Follow-up urine culture  Follow-up blood culture  Continue IV fluids today  Monitor intake output  Follow-up CBC, BMP  S/P TURP  Underwent TURP on 5/1  PRN pain control  In light of difficulty urinating postoperatively and presenting sepsis,   Urology consulted  Follow-up recommendations  Acute kidney injury (HCC)  Possible sequela of sepsis and decreased oral fluid intake post-TURP  CT imaging negative for hydronephrosis but did reveal a small subcentimeter nonobstructing right kidney stone, and a left renal cyst (2 cm)  Presenting creatinine elevated at 2.09 (typical baseline of 0.9-1.0)  Valentine catheter was inserted on admission, bladder scan in  cc  Creatinine improved to 1.0.  Urine output 4.9 L   Monitor renal function and urine output   limit/avoid nephrotoxins and hypotension as possible  Continue IV fluid hydration today  Valentine management as per urology  Bowel regimen as patient reported constipation  Hypertrophic cardiomyopathy (HCC)  Status post ICD  Continue beta-blockade w/ Toprol-XL (holding parameters in place for hypotension)  Type 2 diabetes mellitus (HCC)  Follow-up updated A1c  Hold oral hypoglycemics while hospitalized - initiated SSI coverage per  Accu-Cheks  Carbohydrate restriction  Hypoglycemia protocol  CAD (coronary artery disease)  Continue ASA/statin/Toprol-XL  Essential hypertension  Continue Toprol-XL  Hyperbilirubinemia  Likely sequela of sepsis - transaminases and alkaline phosphatase normal  Resolved, direct bilirubin normal.  No acute hepatobiliary pathology on CT imaging  Continue IV fluids  Limit/avoid hepatotoxins as possible  Psychiatric disorder  Continue Seroquel  SILVERIO (obstructive sleep apnea)  CPAP QHS   Encourage weight loss  Obesity  BMI of 35.86 currently  Lifestyle/diet modifications    VTE Pharmacologic Prophylaxis: VTE Score: 6 Moderate Risk (Score 3-4) - Pharmacological DVT Prophylaxis Ordered: heparin.    Mobility:      -M Goal NOT achieved. Continue with multidisciplinary rounding and encourage appropriate mobility to improve upon -[facility] goals.    Patient Centered Rounds: I performed bedside rounds with nursing staff today.   Discussions with Specialists or Other Care Team Provider: yes    Education and Discussions with Family / Patient:  yes.     Current Length of Stay: 1 day(s)  Current Patient Status: Inpatient   Certification Statement: The patient will continue to require additional inpatient hospital stay due to sepsis, IRON  Discharge Plan: Anticipate discharge in 48-72 hrs to discharge location to be determined pending rehab evaluations.    Code Status: Level 1 - Full Code    Subjective   Sitting up comfortably in bed  Denies any fever, chills, dysuria  Feels much better after catheter placement  Had bowel movement today with soft stool  Denies chest pain shortness of breath or dizziness    Objective :  Temp:  [98.7 °F (37.1 °C)-101.6 °F (38.7 °C)] 98.7 °F (37.1 °C)  HR:  [77-91] 91  BP: (108-141)/(53-78) 111/68  Resp:  [16-24] 19  SpO2:  [95 %-97 %] 97 %  O2 Device: None (Room air)    Body mass index is 35.86 kg/m².     Input and Output Summary (last 24 hours):     Intake/Output Summary (Last 24 hours) at 5/3/2025  0851  Last data filed at 5/3/2025 0845  Gross per 24 hour   Intake --   Output 5400 ml   Net -5400 ml       Physical Exam  Constitutional:       General: He is not in acute distress.     Appearance: He is obese.   HENT:      Head: Normocephalic and atraumatic.   Cardiovascular:      Rate and Rhythm: Normal rate.   Pulmonary:      Effort: Pulmonary effort is normal. No respiratory distress.      Breath sounds: No rhonchi.   Abdominal:      General: Bowel sounds are normal. There is no distension.      Palpations: Abdomen is soft.      Tenderness: There is no abdominal tenderness. There is no guarding or rebound.   Genitourinary:     Comments: Valentine catheter with clear yellow urine  Musculoskeletal:      Cervical back: Neck supple.      Right lower leg: No edema.      Left lower leg: No edema.   Skin:     General: Skin is warm and dry.      Findings: No rash.   Neurological:      Mental Status: He is alert. Mental status is at baseline.      Cranial Nerves: No cranial nerve deficit.           Lines/Drains:  Lines/Drains/Airways       Active Status       Name Placement date Placement time Site Days    Urethral Catheter 16 Fr. 05/02/25  --  --  1                  Urinary Catheter:  Goal for removal:  As per urology                 Lab Results: I have reviewed the following results:   Results from last 7 days   Lab Units 05/03/25  0532   WBC Thousand/uL 8.80   HEMOGLOBIN g/dL 12.0   HEMATOCRIT % 36.0*   PLATELETS Thousands/uL 160   SEGS PCT % 66   LYMPHO PCT % 25   MONO PCT % 8   EOS PCT % 0     Results from last 7 days   Lab Units 05/03/25  0532   SODIUM mmol/L 139   POTASSIUM mmol/L 3.5   CHLORIDE mmol/L 105   CO2 mmol/L 22   BUN mg/dL 17   CREATININE mg/dL 1.03   ANION GAP mmol/L 12   CALCIUM mg/dL 8.5   ALBUMIN g/dL 3.7   TOTAL BILIRUBIN mg/dL 0.80   ALK PHOS U/L 35   ALT U/L 22   AST U/L 28   GLUCOSE RANDOM mg/dL 140     Results from last 7 days   Lab Units 05/02/25 2001   INR  0.96     Results from last 7 days    Lab Units 05/03/25  0719 05/03/25  0037 05/01/25  0840   POC GLUCOSE mg/dl 144* 138 162*         Results from last 7 days   Lab Units 05/03/25  0532 05/02/25 2001   LACTIC ACID mmol/L  --  1.7   PROCALCITONIN ng/ml 0.14 0.08       Recent Cultures (last 7 days):   Results from last 7 days   Lab Units 05/02/25 2001   BLOOD CULTURE  Received in Microbiology Lab. Culture in Progress.       Imaging Results Review: I reviewed radiology reports from this admission including: CT abdomen/pelvis.      Last 24 Hours Medication List:     Current Facility-Administered Medications:     acetaminophen (TYLENOL) tablet 650 mg, Q6H PRN    aspirin chewable tablet 81 mg, Daily    cefTRIAXone (ROCEPHIN) IVPB (premix in dextrose) 2,000 mg 50 mL, Q24H    Cholecalciferol (VITAMIN D3) tablet 1,000 Units, Daily    heparin (porcine) subcutaneous injection 5,000 Units, Q8H NADEEM    HYDROmorphone HCl (DILAUDID) injection 0.2 mg, Q3H PRN    insulin lispro (HumALOG/ADMELOG) 100 units/mL subcutaneous injection 2-12 Units, 4x Daily (AC & HS) **AND** Fingerstick Glucose (POCT), 4x Daily AC and at bedtime    metoprolol succinate (TOPROL-XL) 24 hr tablet 50 mg, Daily    multivitamin stress formula tablet 1 tablet, Daily    nicotine (NICODERM CQ) 7 mg/24hr TD 24 hr patch 1 patch, Daily PRN    ondansetron (ZOFRAN) injection 4 mg, Q4H PRN    oxyCODONE (ROXICODONE) IR tablet 5 mg, Q6H PRN    oxyCODONE (ROXICODONE) split tablet 2.5 mg, Q6H PRN    pravastatin (PRAVACHOL) tablet 40 mg, Daily With Dinner    QUEtiapine (SEROquel) tablet 100 mg, HS    QUEtiapine (SEROquel) tablet 400 mg, HS    sodium chloride 0.9 % infusion, Continuous, Last Rate: 100 mL/hr (05/03/25 0018)    tamsulosin (FLOMAX) capsule 0.4 mg, Daily With Dinner    Administrative Statements   Today, Patient Was Seen By: Rod Aranda MD      **Please Note: This note may have been constructed using a voice recognition system.**

## 2025-05-03 NOTE — ASSESSMENT & PLAN NOTE
Presented with high-grade fever coupled with tachypnea/leukocytosis, along with acute kidney injury and hyperbilirubinemia.  Normal lactic acid, procalcitonin  UA, large blood, small leukocyte, 1+ protein, 2-4 WBC, occasional bacteria.  Likely etiology due to complicated UTI, suspect associated with TURP, difficulty in voiding   CT abdomen with postoperative TURP changes  Leukocytosis on presentation, now improved  Clinically stable  Continue IV ceftriaxone, appears to have received prior to collection of the urine specimen  Follow-up urine culture  Follow-up blood culture  Continue IV fluids today  Monitor intake output  Follow-up CBC, BMP

## 2025-05-04 VITALS
HEIGHT: 72 IN | RESPIRATION RATE: 18 BRPM | SYSTOLIC BLOOD PRESSURE: 139 MMHG | WEIGHT: 264.4 LBS | OXYGEN SATURATION: 95 % | BODY MASS INDEX: 35.81 KG/M2 | DIASTOLIC BLOOD PRESSURE: 74 MMHG | TEMPERATURE: 98.3 F | HEART RATE: 78 BPM

## 2025-05-04 PROBLEM — A41.9 SEPSIS (HCC): Status: RESOLVED | Noted: 2025-05-03 | Resolved: 2025-05-04

## 2025-05-04 PROBLEM — N17.9 ACUTE KIDNEY INJURY (HCC): Status: RESOLVED | Noted: 2025-05-03 | Resolved: 2025-05-04

## 2025-05-04 PROBLEM — E80.6 HYPERBILIRUBINEMIA: Status: RESOLVED | Noted: 2025-05-03 | Resolved: 2025-05-04

## 2025-05-04 LAB
ALBUMIN SERPL BCG-MCNC: 3.3 G/DL (ref 3.5–5)
ALP SERPL-CCNC: 32 U/L (ref 34–104)
ALT SERPL W P-5'-P-CCNC: 19 U/L (ref 7–52)
ANION GAP SERPL CALCULATED.3IONS-SCNC: 11 MMOL/L (ref 4–13)
AST SERPL W P-5'-P-CCNC: 19 U/L (ref 13–39)
BACTERIA UR CULT: NORMAL
BASOPHILS # BLD AUTO: 0.01 THOUSANDS/ÂΜL (ref 0–0.1)
BASOPHILS NFR BLD AUTO: 0 % (ref 0–1)
BILIRUB SERPL-MCNC: 0.6 MG/DL (ref 0.2–1)
BUN SERPL-MCNC: 9 MG/DL (ref 5–25)
CALCIUM ALBUM COR SERPL-MCNC: 8.7 MG/DL (ref 8.3–10.1)
CALCIUM SERPL-MCNC: 8.1 MG/DL (ref 8.4–10.2)
CHLORIDE SERPL-SCNC: 105 MMOL/L (ref 96–108)
CO2 SERPL-SCNC: 22 MMOL/L (ref 21–32)
CREAT SERPL-MCNC: 0.76 MG/DL (ref 0.6–1.3)
EOSINOPHIL # BLD AUTO: 0.12 THOUSAND/ÂΜL (ref 0–0.61)
EOSINOPHIL NFR BLD AUTO: 2 % (ref 0–6)
ERYTHROCYTE [DISTWIDTH] IN BLOOD BY AUTOMATED COUNT: 14.1 % (ref 11.6–15.1)
GFR SERPL CREATININE-BSD FRML MDRD: 93 ML/MIN/1.73SQ M
GLUCOSE SERPL-MCNC: 151 MG/DL (ref 65–140)
GLUCOSE SERPL-MCNC: 153 MG/DL (ref 65–140)
GLUCOSE SERPL-MCNC: 192 MG/DL (ref 65–140)
HCT VFR BLD AUTO: 33.8 % (ref 36.5–49.3)
HGB BLD-MCNC: 11.2 G/DL (ref 12–17)
IMM GRANULOCYTES # BLD AUTO: 0.03 THOUSAND/UL (ref 0–0.2)
IMM GRANULOCYTES NFR BLD AUTO: 0 % (ref 0–2)
LYMPHOCYTES # BLD AUTO: 2.08 THOUSANDS/ÂΜL (ref 0.6–4.47)
LYMPHOCYTES NFR BLD AUTO: 27 % (ref 14–44)
MCH RBC QN AUTO: 30.9 PG (ref 26.8–34.3)
MCHC RBC AUTO-ENTMCNC: 33.1 G/DL (ref 31.4–37.4)
MCV RBC AUTO: 93 FL (ref 82–98)
MONOCYTES # BLD AUTO: 0.75 THOUSAND/ÂΜL (ref 0.17–1.22)
MONOCYTES NFR BLD AUTO: 10 % (ref 4–12)
NEUTROPHILS # BLD AUTO: 4.65 THOUSANDS/ÂΜL (ref 1.85–7.62)
NEUTS SEG NFR BLD AUTO: 61 % (ref 43–75)
NRBC BLD AUTO-RTO: 0 /100 WBCS
PLATELET # BLD AUTO: 146 THOUSANDS/UL (ref 149–390)
PMV BLD AUTO: 10.6 FL (ref 8.9–12.7)
POTASSIUM SERPL-SCNC: 3.4 MMOL/L (ref 3.5–5.3)
PROT SERPL-MCNC: 5.8 G/DL (ref 6.4–8.4)
RBC # BLD AUTO: 3.63 MILLION/UL (ref 3.88–5.62)
SODIUM SERPL-SCNC: 138 MMOL/L (ref 135–147)
WBC # BLD AUTO: 7.64 THOUSAND/UL (ref 4.31–10.16)

## 2025-05-04 PROCEDURE — 80053 COMPREHEN METABOLIC PANEL: CPT | Performed by: INTERNAL MEDICINE

## 2025-05-04 PROCEDURE — 99239 HOSP IP/OBS DSCHRG MGMT >30: CPT | Performed by: INTERNAL MEDICINE

## 2025-05-04 PROCEDURE — 82948 REAGENT STRIP/BLOOD GLUCOSE: CPT

## 2025-05-04 PROCEDURE — 85025 COMPLETE CBC W/AUTO DIFF WBC: CPT | Performed by: INTERNAL MEDICINE

## 2025-05-04 RX ORDER — POLYETHYLENE GLYCOL 3350 17 G/17G
17 POWDER, FOR SOLUTION ORAL DAILY PRN
Status: DISCONTINUED | OUTPATIENT
Start: 2025-05-04 | End: 2025-05-04 | Stop reason: HOSPADM

## 2025-05-04 RX ORDER — POTASSIUM CHLORIDE 1500 MG/1
40 TABLET, EXTENDED RELEASE ORAL ONCE
Status: COMPLETED | OUTPATIENT
Start: 2025-05-04 | End: 2025-05-04

## 2025-05-04 RX ORDER — DOCUSATE SODIUM 100 MG/1
100 CAPSULE, LIQUID FILLED ORAL 2 TIMES DAILY
Status: DISCONTINUED | OUTPATIENT
Start: 2025-05-04 | End: 2025-05-04 | Stop reason: HOSPADM

## 2025-05-04 RX ORDER — CEFPODOXIME PROXETIL 200 MG/1
200 TABLET, FILM COATED ORAL 2 TIMES DAILY
Qty: 10 TABLET | Refills: 0 | Status: SHIPPED | OUTPATIENT
Start: 2025-05-04 | End: 2025-05-09

## 2025-05-04 RX ADMIN — Medication 1 TABLET: at 08:37

## 2025-05-04 RX ADMIN — CHOLECALCIFEROL TAB 25 MCG (1000 UNIT) 1000 UNITS: 25 TAB at 08:37

## 2025-05-04 RX ADMIN — ASPIRIN 81 MG: 81 TABLET, CHEWABLE ORAL at 08:37

## 2025-05-04 RX ADMIN — POTASSIUM CHLORIDE 40 MEQ: 1500 TABLET, EXTENDED RELEASE ORAL at 13:03

## 2025-05-04 RX ADMIN — HEPARIN SODIUM 5000 UNITS: 5000 INJECTION INTRAVENOUS; SUBCUTANEOUS at 13:03

## 2025-05-04 RX ADMIN — HEPARIN SODIUM 5000 UNITS: 5000 INJECTION INTRAVENOUS; SUBCUTANEOUS at 06:14

## 2025-05-04 RX ADMIN — METOPROLOL SUCCINATE 50 MG: 50 TABLET, EXTENDED RELEASE ORAL at 08:37

## 2025-05-04 RX ADMIN — INSULIN LISPRO 2 UNITS: 100 INJECTION, SOLUTION INTRAVENOUS; SUBCUTANEOUS at 08:38

## 2025-05-04 RX ADMIN — INSULIN LISPRO 2 UNITS: 100 INJECTION, SOLUTION INTRAVENOUS; SUBCUTANEOUS at 13:03
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
## 2025-05-04 NOTE — DISCHARGE INSTR - AVS FIRST PAGE
Continue antibiotics as instructed    Follow-up with urology after discharge    Avoid constipation

## 2025-05-04 NOTE — ASSESSMENT & PLAN NOTE
Likely sequela of sepsis - transaminases and alkaline phosphatase normal  Resolved, direct bilirubin normal.  No acute hepatobiliary pathology on CT imaging  Limit/avoid hepatotoxins as possible

## 2025-05-04 NOTE — PROGRESS NOTES
Progress Note - Urology      Patient: Chase Man   : 1955 Sex: male   MRN: [id number]     CSN: [id number]  Unit/Bed#: [address] [place] [address]     SUBJECTIVE:   Vs stable  No fevers  Renal failure cleared        Objective   Vitals: /74   Pulse 78   Temp 98.3 °F (36.8 °C)   Resp 18   Ht 6' (1.829 m)   Wt 120 kg (264 lb 6.4 oz)   SpO2 95%   BMI 35.86 kg/m²     I/O last 24 hours:  In: . [I.V.:.; IV Piggyback:50]  Out:  [Urine:]      Physical Exam:   General Alert awake   Normocephalic atraumatic PERRLA  Lungs clear bilaterally  Cardiac normal S1 normal S2  Abdomen soft, flank pain  Extremities no edema      Lab Results: CBC:   Lab Results   Component Value Date    WBC 7.64 2025    HGB 11.2 (L) 2025    HCT 33.8 (L) 2025    MCV 93 2025     (L) 2025    RBC 3.63 (L) 2025    MCH 30.9 2025    MCHC 33.1 2025    RDW 14.1 2025    MPV 10.6 2025    NRBC 0 2025     CMP:   Lab Results   Component Value Date     2025     2024    CO2 22 2025    CO2 30 2024    BUN 9 2025    BUN 15 2024    CREATININE 0.76 2025    CREATININE 0.94 2024    CALCIUM 8.1 (L) 2025    CALCIUM 9.4 2024    AST 19 2025    AST 17 2019    ALT 19 2025    ALT 15 2019    ALKPHOS 32 (L) 2025    ALKPHOS 47 2019    EGFR 93 2025    EGFR 89 2024     Urinalysis:   Lab Results   Component Value Date    COLORU Yellow 2025    CLARITYU Clear 2025    SPECGRAV 1.010 2025    PHUR 5.5 2025    LEUKOCYTESUR Small (A) 2025    NITRITE Negative 2025    GLUCOSEU Negative 2025    KETONESU Negative 2025    BILIRUBINUR Negative 2025    BLOODU Large (A) 2025     Urine Culture:   Lab Results   Component Value Date    URINECX No Growth <1000 cfu/mL 2025     PSA: No results found for:  "\"PSA\"      Assessment/ Plan:  Renal failure   Creatine trending down  No fevers  Ucs neg  Cleared dc   Will see in office bullock out tuesday          Ike Ramirez MD  "

## 2025-05-04 NOTE — ASSESSMENT & PLAN NOTE
Underwent TURP on 5/1  PRN pain control  In light of difficulty urinating postoperatively and presenting sepsis,   Urology consulted-input appreciated  Doing well postoperatively  Follow-up after discharge  Maintain Valentine for now

## 2025-05-04 NOTE — ASSESSMENT & PLAN NOTE
Presented with high-grade fever coupled with tachypnea/leukocytosis, along with acute kidney injury and hyperbilirubinemia.  Normal lactic acid, procalcitonin  UA, large blood, small leukocyte, 1+ protein, 2-4 WBC, occasional bacteria.  Likely etiology due to complicated UTI, suspect associated with TURP, difficulty in voiding   CT abdomen with postoperative TURP changes  Leukocytosis on presentation, now improved  Clinically stable, afebrile hemodynamic stable.  Blood culture urine culture negative.  Received ceftriaxone IV ceftriaxone, appears to have received prior to collection of the urine specimen  Will transition to cefpodoxime to complete 7 days  Follow-up final blood culture

## 2025-05-04 NOTE — ASSESSMENT & PLAN NOTE
Possible sequela of sepsis and decreased oral fluid intake post-TURP  CT imaging negative for hydronephrosis but did reveal a small subcentimeter nonobstructing right kidney stone, and a left renal cyst (2 cm)  Presenting creatinine elevated at 2.09 (typical baseline of 0.9-1.0)  Valentine catheter was inserted on admission, bladder scan in  cc  Creatinine improved to 1.0.  Urine output 4.9 L post Valentine placement  Remains with good urine output, urine clear  Tolerating diet well  Continue Valentine for now  Avoid nephrotoxins/hypotension  Valentine management as per urology, voiding trial as outpatient  Bowel regimen as needed constipation

## 2025-05-04 NOTE — INCIDENTAL FINDINGS
The following findings require follow up:  Radiographic finding   Finding:   CT Scan -   Evidence of recent TURP procedure.   Coronary atherosclerosis, right-sided nephrolithiasis, left renal cyst, colonic diverticulosis without evidence of acute diverticulitis, and other findings as above.    Follow up required: As needed    Please notify the following clinician to assist with the follow up:   PCP      Incidental finding results were discussed with the Patient and Patient's POA by Rod Aranda MD on 05/04/25.   They expressed understanding and all questions answered.

## 2025-05-05 PROCEDURE — 88305 TISSUE EXAM BY PATHOLOGIST: CPT | Performed by: PATHOLOGY

## 2025-05-07 LAB — BACTERIA BLD CULT: NORMAL

## 2025-05-08 LAB — BACTERIA BLD CULT: NORMAL

## (undated) DEVICE — CYSTOSCOPY PACK: Brand: CONVERTORS

## (undated) DEVICE — CYSTO TUBING TUR Y IRRIGATION

## (undated) DEVICE — POUCH URO CATCHER II STERILE

## (undated) DEVICE — LUBRICANT JELLY SURGILUBE TUBE 4OZ FLIP TOP

## (undated) DEVICE — Device

## (undated) DEVICE — SYRINGE 20ML LL

## (undated) DEVICE — ELECTRODE,CUTTING,STERILE.24FR: Brand: N.A.

## (undated) DEVICE — SPECIMEN CONTAINER STERILE PEEL PACK

## (undated) DEVICE — SPONGE STICK WITH PVP-I: Brand: KENDALL

## (undated) DEVICE — BAG URINE DRAINAGE 4000ML CONTINUOUS IRR

## (undated) DEVICE — STERILE DOUBLE BASIN SET PACK: Brand: CARDINAL HEALTH

## (undated) DEVICE — GLOVE SRG LF STRL BGL SKNSNS 8 PF

## (undated) DEVICE — GROUNDING PAD UNIVERSAL SLW

## (undated) DEVICE — STANDARD SURGICAL GOWN, L: Brand: CONVERTORS

## (undated) DEVICE — RESECTOSCOPE LOOP ELECTRODE 27 FR 27050F/6

## (undated) DEVICE — EVACUATOR BLADDER ELLIK DISP STRL